# Patient Record
Sex: MALE | Race: ASIAN | NOT HISPANIC OR LATINO | Employment: OTHER | URBAN - METROPOLITAN AREA
[De-identification: names, ages, dates, MRNs, and addresses within clinical notes are randomized per-mention and may not be internally consistent; named-entity substitution may affect disease eponyms.]

---

## 2017-01-26 ENCOUNTER — HOSPITAL ENCOUNTER (EMERGENCY)
Facility: HOSPITAL | Age: 77
Discharge: HOME | End: 2017-01-26
Attending: EMERGENCY MEDICINE
Payer: MEDICARE

## 2017-01-26 VITALS
RESPIRATION RATE: 18 BRPM | WEIGHT: 155 LBS | TEMPERATURE: 98 F | HEIGHT: 67 IN | SYSTOLIC BLOOD PRESSURE: 132 MMHG | HEART RATE: 76 BPM | BODY MASS INDEX: 24.33 KG/M2 | OXYGEN SATURATION: 99 % | DIASTOLIC BLOOD PRESSURE: 63 MMHG

## 2017-01-26 DIAGNOSIS — N39.0 URINARY TRACT INFECTION WITH HEMATURIA, SITE UNSPECIFIED: Primary | ICD-10-CM

## 2017-01-26 DIAGNOSIS — R31.9 HEMATURIA: ICD-10-CM

## 2017-01-26 DIAGNOSIS — R31.9 URINARY TRACT INFECTION WITH HEMATURIA, SITE UNSPECIFIED: Primary | ICD-10-CM

## 2017-01-26 LAB
ANION GAP SERPL CALC-SCNC: 7 MMOL/L
ANISOCYTOSIS BLD QL SMEAR: SLIGHT
BACTERIA #/AREA URNS AUTO: ABNORMAL /HPF
BASOPHILS # BLD AUTO: 0 K/UL
BASOPHILS NFR BLD: 0 %
BILIRUB UR QL STRIP: NEGATIVE
BUN SERPL-MCNC: 58 MG/DL
CALCIUM SERPL-MCNC: 9.7 MG/DL
CHLORIDE SERPL-SCNC: 104 MMOL/L
CLARITY UR REFRACT.AUTO: ABNORMAL
CO2 SERPL-SCNC: 28 MMOL/L
COLOR UR AUTO: ABNORMAL
CREAT SERPL-MCNC: 1.5 MG/DL
DIFFERENTIAL METHOD: ABNORMAL
EOSINOPHIL # BLD AUTO: 0 K/UL
EOSINOPHIL NFR BLD: 0 %
ERYTHROCYTE [DISTWIDTH] IN BLOOD BY AUTOMATED COUNT: 15.9 %
EST. GFR  (AFRICAN AMERICAN): 50.8 ML/MIN/1.73 M^2
EST. GFR  (NON AFRICAN AMERICAN): 44 ML/MIN/1.73 M^2
GLUCOSE SERPL-MCNC: 143 MG/DL
GLUCOSE UR QL STRIP: NEGATIVE
HCT VFR BLD AUTO: 40.4 %
HGB BLD-MCNC: 13 G/DL
HGB UR QL STRIP: ABNORMAL
HYALINE CASTS UR QL AUTO: 0 /LPF
INR PPP: 1
KETONES UR QL STRIP: NEGATIVE
LEUKOCYTE ESTERASE UR QL STRIP: ABNORMAL
LYMPHOCYTES # BLD AUTO: 0.8 K/UL
LYMPHOCYTES NFR BLD: 8.2 %
MCH RBC QN AUTO: 30.7 PG
MCHC RBC AUTO-ENTMCNC: 32.2 %
MCV RBC AUTO: 95 FL
MICROSCOPIC COMMENT: ABNORMAL
MONOCYTES # BLD AUTO: 1.2 K/UL
MONOCYTES NFR BLD: 12.6 %
NEUTROPHILS # BLD AUTO: 7.4 K/UL
NEUTROPHILS NFR BLD: 79.2 %
NITRITE UR QL STRIP: NEGATIVE
PH UR STRIP: 5 [PH] (ref 5–8)
PLATELET # BLD AUTO: 125 K/UL
PLATELET BLD QL SMEAR: ABNORMAL
PMV BLD AUTO: ABNORMAL FL
POTASSIUM SERPL-SCNC: 5.2 MMOL/L
PROT UR QL STRIP: ABNORMAL
PROTHROMBIN TIME: 10.3 SEC
RBC # BLD AUTO: 4.24 M/UL
RBC #/AREA URNS AUTO: >100 /HPF (ref 0–4)
SODIUM SERPL-SCNC: 139 MMOL/L
SP GR UR STRIP: 1.02 (ref 1–1.03)
SQUAMOUS #/AREA URNS AUTO: 2 /HPF
URN SPEC COLLECT METH UR: ABNORMAL
UROBILINOGEN UR STRIP-ACNC: NEGATIVE EU/DL
WBC # BLD AUTO: 9.35 K/UL
WBC #/AREA URNS AUTO: >100 /HPF (ref 0–5)

## 2017-01-26 PROCEDURE — 80048 BASIC METABOLIC PNL TOTAL CA: CPT

## 2017-01-26 PROCEDURE — 81001 URINALYSIS AUTO W/SCOPE: CPT

## 2017-01-26 PROCEDURE — 99284 EMERGENCY DEPT VISIT MOD MDM: CPT | Mod: ,,, | Performed by: PHYSICIAN ASSISTANT

## 2017-01-26 PROCEDURE — 85610 PROTHROMBIN TIME: CPT

## 2017-01-26 PROCEDURE — 85025 COMPLETE CBC W/AUTO DIFF WBC: CPT

## 2017-01-26 PROCEDURE — 87086 URINE CULTURE/COLONY COUNT: CPT

## 2017-01-26 PROCEDURE — 25000003 PHARM REV CODE 250: Performed by: PHYSICIAN ASSISTANT

## 2017-01-26 PROCEDURE — 99284 EMERGENCY DEPT VISIT MOD MDM: CPT

## 2017-01-26 RX ORDER — CEPHALEXIN 500 MG/1
500 CAPSULE ORAL EVERY 12 HOURS
Qty: 14 CAPSULE | Refills: 0 | Status: SHIPPED | OUTPATIENT
Start: 2017-01-27 | End: 2017-02-03

## 2017-01-26 RX ORDER — CEPHALEXIN 500 MG/1
500 CAPSULE ORAL
Status: COMPLETED | OUTPATIENT
Start: 2017-01-26 | End: 2017-01-26

## 2017-01-26 RX ADMIN — CEPHALEXIN 500 MG: 500 CAPSULE ORAL at 03:01

## 2017-01-26 NOTE — ED NOTES
Patient identifiers verified and correct for Anirudh Esmail.    C/C: hematuria  APPEARANCE: awake and alert in NAD.  SKIN: warm, dry and intact. No breakdown or bruising.  MUSCULOSKELETAL: Patient moving all extremities spontaneously, no obvious swelling or deformities noted. Ambulates independently.  RESPIRATORY: no shortness of breath. All breath sounds CTA bilaterally.  CARDIAC: heart tones normal. Regular rate and rhythm; 2+ distal pulses; no peripheral edema  ABDOMEN: S/ND/NT, normoactive bowel sounds present in all four quadrants. Normal stool pattern.  : painful hematuria.  Neurologic: AAO x 4; follows commands equal strength in all extremities; denies numbness/tingling. PERRLA

## 2017-01-26 NOTE — ED AVS SNAPSHOT
[facility]  [address] [facility] [address]               Anirudh SUZI Esmail   2017 12:51 PM   ED    Description:  Male : 1940   Department:  [facility]           Your Care was Coordinated By:     Provider Role From To    Pranav Vela MD Attending Provider [phone] --    Alejandra Bhakta PA-C Physician Assistant [phone] --      Reason for Visit     Hematuria           Diagnoses this Visit        Comments    Urinary tract infection with hematuria, site unspecified    -  Primary     Hematuria           ED Disposition     None           To Do List           Follow-up Information     Follow up with Benedict Ennis MD In 1 week.    Specialty:  Internal Medicine    Contact information:    [address]  [place] [address] [phone]          Follow up with [facility] - Urology 4th Floor In 1 day.    Specialty:  Urology    Contact information:    [address] [facility] [id number] [phone]    Additional information:    [facility] - 4th Floor        Follow up with [facility].    Specialty:  Emergency Medicine    Why:  If symptoms worsen    Contact information:    [address] [facility] [id number] [phone]       These Medications        Disp Refills Start End    cephALEXin (KEFLEX) 500 MG capsule 14 capsule 0 2017 2/3/2017    Take 1 capsule (500 mg total) by mouth every 12 (twelve) hours. - Oral      [facility]     [facility] -  Assistance  Registered nurses in the [facility] provide clinical advisement, health education, appointment booking, and other advisory services.  Call for this free service at [phone].             Medications           Message regarding Medications     Verify the changes and/or additions to your medication regime listed below are the same as discussed with your clinician  today.  If any of these changes or additions are incorrect, please notify your healthcare provider.        START taking these NEW medications        Refills    cephALEXin (KEFLEX) 500 MG capsule 0    Starting on: 1/27/2017    Sig: Take 1 capsule (500 mg total) by mouth every 12 (twelve) hours.    Class: Print    Route: Oral      These medications were administered today        Dose Freq    cephALEXin capsule 500 mg 500 mg ED 1 Time    Sig: Take 1 capsule (500 mg total) by mouth ED 1 Time.    Class: Normal    Route: Oral    Cosign for Ordering: Required by Pranav Vela MD           Verify that the below list of medications is an accurate representation of the medications you are currently taking.  If none reported, the list may be blank. If incorrect, please contact your healthcare provider. Carry this list with you in case of emergency.           Current Medications     acetaminophen (TYLENOL) 500 mg Cap Please administer via GT every 6 hourly as needed    ascorbic acid, vitamin C, (VITAMIN C) 250 MG tablet 1 tablet (250 mg total) by Per G Tube route once daily.    atorvastatin (LIPITOR) 20 MG tablet 1 tablet (20 mg total) by Per G Tube route once daily.    cephALEXin (KEFLEX) 500 MG capsule Starting on Jan 27, 2017. Take 1 capsule (500 mg total) by mouth every 12 (twelve) hours.    cephALEXin capsule 500 mg Take 1 capsule (500 mg total) by mouth ED 1 Time.    cholestyramine (QUESTRAN) 4 gram packet     clopidogrel (PLAVIX) 75 mg tablet 1 tablet (75 mg total) by Per G Tube route once daily.    ferrous sulfate 220 mg (44 mg iron)/5 mL solution     finasteride (PROSCAR) 5 mg tablet 1 tablet (5 mg total) by Per G Tube route once daily.    furosemide (LASIX) 20 MG tablet 1 tablet (20 mg total) by Per G Tube route once daily.    lisinopril (PRINIVIL,ZESTRIL) 2.5 MG tablet 1 tablet (2.5 mg total) by Per G Tube route once daily.    magnesium oxide (MAG-OX) 400 mg tablet 1 tablet (400 mg total) by Per G Tube  "route 2 (two) times daily.    nitroGLYCERIN (NITROSTAT) 0.4 MG SL tablet Place 1 tablet (0.4 mg total) under the tongue every 5 (five) minutes as needed for Chest pain.    pantoprazole (PROTONIX) 40 MG tablet     pramipexole (MIRAPEX) 0.125 MG tablet 1 tablet (0.125 mg total) by Per G Tube route 2 (two) times daily.    tamsulosin (FLOMAX) 0.4 mg Cp24 1 capsule (0.4 mg total) by Per G Tube route once daily.    trazodone (DESYREL) 50 MG tablet 1 tablet (50 mg total) by Per G Tube route every evening.           Clinical Reference Information           Your Vitals Were     BP Pulse Temp Resp Height Weight    120/58 70 97.6 °F (36.4 °C) (Oral) 16 5' 7" (1.702 m) 70.3 kg (155 lb)    SpO2 BMI             97% 24.28 kg/m2         Allergies as of 1/26/2017        Reactions    Pork/porcine Containing Products     Patient wishes to avoid pork products in his diet.      Immunizations Administered on Date of Encounter - 1/26/2017     None      ED Micro, Lab, POCT     Start Ordered       Status Ordering Provider    01/26/17 1457 01/26/17 1456  Protime-INR  Add-on      Completed     01/26/17 1441 01/26/17 1440  Urine culture **CANNOT BE ORDERED STAT**  Add-on      Completed     01/26/17 1318 01/26/17 1317  CBC auto differential  STAT      Final result     01/26/17 1318 01/26/17 1317  Basic metabolic panel  STAT      Final result     01/26/17 1300 01/26/17 1259  Urinalysis  STAT      Final result     01/26/17 1259 01/26/17 1259  Urinalysis Microscopic  Once      Final result       ED Imaging Orders     None        Discharge Instructions       Closely follow up with urology.  Take oral antibiotics (Keflex) 2 times a day for the next 7 days. Stay hydrated by drinking plenty of fluids. Follow-up with PCP as scheduled or earlier if needed.  Return to ED for any worsening symptoms.      Bladder Infection, Male (Adult)    You have a bladder infection.  Urine is normally free of bacteria. But bacteria can get into the urinary tract from " the skin around the rectum or it may travel in the blood from elsewhere in the body.  This is called a urinary tract infection (UTI). An infection can occur anywhere in the urinary tract. It could be in a kidney (pyelonrphritis)or in the bladder (cystitis) and urethra (urethritis). The urethra is the tube that drains the urine from the bladder through the tip of the penis.  The most common place for a UTI is in the bladder. This is called a bladder infection. Most bladder infections are easily treated. They are not serious unless the infection spreads up to the kidney.  The terms bladder infection, UTI, and cystitis are often used to describe the same thing, but they arent always the same. Cystitis is an inflammation of the bladder. The most common cause of cystitis is an infection.   Keep in mind:  · Infections in the urine are called UTIs.  · Cystitis is usually caused by a UTI.  · Not all UTIs and cases of cystitis are bladder infections.  · Bladder infections are the most common type of cystitis.  Symptoms of a bladder infection  The infection causes inflammation in the urethra and bladder. This inflammation causes many of the symptoms. The most common symptoms of a bladder infection are:  · Pain or burning when urinating  · Having to go more often than usual  · Feeling like you need to go right away  · Only a small amount comes out  · Blood in urine  · Discomfort in your belly (abdomen), usually in the lower abdomen, above the pubic bone  · Cloudy, strong, or bad smelling urine  · Unable to urinate (retention)  · Urinary incontinence  · Fever  · Loss of appetite  Older adults may also feel confused.  Causes of a bladder infection  Bladder infections are not contagious. You can't get one from someone else, from a toilet seat, or from sharing a bath.  The most common cause of bladder infections is bacteria from the bowels. The bacteria get onto the skin around the opening of the urethra. From there they can get  into the urine and travel up to the bladder. This causes inflammation and an infection. This usually happens because of:  · An enlarged prostate  · Poor cleaning of the genitals  · Procedures that put a tube in your bladder, like a Fontana catheter  · Bowel incontinence  · Older age  · Not emptying your bladder (The urine stays there, giving the bacteria a chance to grow.)  · Dehydration (This allows urine to stay in the bladder longer.)  · Constipation (This can cause the bowels to push on the bladder or urethra and keep the bladder from emptying.)  Treatment  Bladder infections are treated with antibiotics. They usually clear up quickly without complications. Treatment helps prevent a more serious kidney infection.  Medicines  Medicines can help in the treatment of a bladder infection:  · You have been prescribed antibiotics. Take this medicine until you have finished it, even if you feel better. Taking all of the medicine will make sure the infection has cleared.  You can use acetaminophen or ibuprofen for pain, fever, or discomfort, unless another medicine was prescribed. You can also alternate them, or use both together. They work differently and are a different class of medicines, so taking them together is not an overdose. If you have chronic liver or kidney disease, talk with your healthcare provider before using these medicines. Also talk with your provider if youve had a stomach ulcer or GI bleeding or are taking blood thinner medicines.  · You may have been given phenazopyridine to ease burning when you urinate. It will cause your urine to be bright orange. It can stain clothing.  Home care  General care  · Drink plenty of fluids, unless your healthcare provider told you not to. Fluids will prevent dehydration and flush out your bladder.  · Use good personal hygiene. Wipe from front to back after using the toilet, and clean your penis regularly. If you arent circumcised, retract the foreskin when  cleaning.  · Urinate more frequently, and dont try to hold it in for long periods of time, if possible.  · Wear loose-fitting clothes and cotton underwear. Avoid tight-fitting pants. This helps keep you clean and dry.  · Change your diet to prevent constipation. This means eating more fresh foods and more fiber, and less junk and fatty foods.  · Avoid sex until your symptoms are gone.  · Avoid caffeine, alcohol, and spicy foods. These can irritate the bladder.  Follow-up care  Follow up with your healthcare provider, or as advised if all symptoms have not cleared up within 5 days. It is important to keep your follow-up appointment. You can talk with your provider to see if you need more tests of the urinary tract. This is especially important if you have infections that keep coming back.  If a culture was done, you will be told if your treatment needs to be changed. If directed, you can call to find out the results.  If X-rays were taken, you will be told if the results will affect your treatment.  Call 911  Call 911 if any of these occur:  · Trouble breathing  · Difficulty waking up  · Feeling confused  · Fainting or loss of consciousness  · Rapid heart rate  When to seek medical advice  Call your healthcare provider right away if any of these occur:  · Fever of 100.4ºF (38ºC) or higher, or as directed by your healthcare provider  · Your symptoms dont get better after 2 days of treatment  · Back or abdominal pain that gets worse  · Repeated vomiting, or you arent able to keep medicine down  · Weakness or dizziness  © 7210-7397 [employer]. [address] [place] [address], [place] [address]. All rights reserved. This information is not intended as a substitute for professional medical care. Always follow your healthcare professional's instructions.          What is Hematuria?  Blood in your urine is a condition known as hematuria. Most of the time, the cause of hematuria is not serious. However, blood in the  urine should never be ignored. Your doctor can evaluate you to identify the cause of the bleeding and treat it, if necessary.    Types of hematuria  · Gross hematuria means that the blood can be seen by the naked eye. The urine may look pinkish, brownish, or bright red.  · Microscopic hematuria means that the urine is clear, but blood cells can be seen when urine is looked at under a microscope or tested in a lab.  Both gross and microscopic hematuria can have the same causes, and neither one is necessarily more serious than the other. Along with either type, you may notice other symptoms, such as pain, pressure, or burning when you urinate, abdominal pain, or back pain. Or, you may not notice any other symptoms. No matter how much blood is found, the cause of the bleeding needs to be identified.  Finding the cause of hematuria  To evaluate your condition, the doctor will first confirm that blood is indeed present. Then other tests are done to pinpoint where the blood is coming from and why. Your doctor will decide which tests will best determine the cause of your hematuria. Some common tests are listed below.  · History and physical exam  · Lab tests may include urinalysis, a urine culture, a urine cytology, and other blood tests  · Intravenous pyelogram (IVP)  · Cystoscopy  · Other tests may include:  ¨ Computed tomography (CT) or CT urography to study the kidneys and urinary tract  ¨ Magnetic resonance imaging (MRI) or MR urography  ¨ Ultrasound of the kidney to study the kidneys and the urinary tract  ¨ Cystourethrogram  © [id number] [employer]. [address] [place] [address], [place] [address]. All rights reserved. This information is not intended as a substitute for professional medical care. Always follow your healthcare professional's instructions.       [facility] complies with applicable Federal civil rights laws and does not discriminate on the basis of race, color, national  origin, age, disability, or sex.        Language Assistance Services     ATTENTION: Language assistance services are available, free of charge. Please call [phone].      ATENCIÓN: Si habdmitriy lafleur, tiene a mulligan disposición servicios gratuitos de asistencia lingüística. Llame al [phone].     CHÚ Ý: N?u b?n nói Ti?ng Vi?t, có các d?ch v? h? tr? ngôn ng? mi?n phí dành cho b?n. G?i s? [phone].

## 2017-01-26 NOTE — DISCHARGE INSTRUCTIONS
Closely follow up with urology.  Take oral antibiotics (Keflex) 2 times a day for the next 7 days. Stay hydrated by drinking plenty of fluids. Follow-up with PCP as scheduled or earlier if needed.  Return to ED for any worsening symptoms.      Bladder Infection, Male (Adult)    You have a bladder infection.  Urine is normally free of bacteria. But bacteria can get into the urinary tract from the skin around the rectum or it may travel in the blood from elsewhere in the body.  This is called a urinary tract infection (UTI). An infection can occur anywhere in the urinary tract. It could be in a kidney (pyelonrphritis)or in the bladder (cystitis) and urethra (urethritis). The urethra is the tube that drains the urine from the bladder through the tip of the penis.  The most common place for a UTI is in the bladder. This is called a bladder infection. Most bladder infections are easily treated. They are not serious unless the infection spreads up to the kidney.  The terms bladder infection, UTI, and cystitis are often used to describe the same thing, but they arent always the same. Cystitis is an inflammation of the bladder. The most common cause of cystitis is an infection.   Keep in mind:  · Infections in the urine are called UTIs.  · Cystitis is usually caused by a UTI.  · Not all UTIs and cases of cystitis are bladder infections.  · Bladder infections are the most common type of cystitis.  Symptoms of a bladder infection  The infection causes inflammation in the urethra and bladder. This inflammation causes many of the symptoms. The most common symptoms of a bladder infection are:  · Pain or burning when urinating  · Having to go more often than usual  · Feeling like you need to go right away  · Only a small amount comes out  · Blood in urine  · Discomfort in your belly (abdomen), usually in the lower abdomen, above the pubic bone  · Cloudy, strong, or bad smelling urine  · Unable to urinate (retention)  · Urinary  incontinence  · Fever  · Loss of appetite  Older adults may also feel confused.  Causes of a bladder infection  Bladder infections are not contagious. You can't get one from someone else, from a toilet seat, or from sharing a bath.  The most common cause of bladder infections is bacteria from the bowels. The bacteria get onto the skin around the opening of the urethra. From there they can get into the urine and travel up to the bladder. This causes inflammation and an infection. This usually happens because of:  · An enlarged prostate  · Poor cleaning of the genitals  · Procedures that put a tube in your bladder, like a Fontana catheter  · Bowel incontinence  · Older age  · Not emptying your bladder (The urine stays there, giving the bacteria a chance to grow.)  · Dehydration (This allows urine to stay in the bladder longer.)  · Constipation (This can cause the bowels to push on the bladder or urethra and keep the bladder from emptying.)  Treatment  Bladder infections are treated with antibiotics. They usually clear up quickly without complications. Treatment helps prevent a more serious kidney infection.  Medicines  Medicines can help in the treatment of a bladder infection:  · You have been prescribed antibiotics. Take this medicine until you have finished it, even if you feel better. Taking all of the medicine will make sure the infection has cleared.  You can use acetaminophen or ibuprofen for pain, fever, or discomfort, unless another medicine was prescribed. You can also alternate them, or use both together. They work differently and are a different class of medicines, so taking them together is not an overdose. If you have chronic liver or kidney disease, talk with your healthcare provider before using these medicines. Also talk with your provider if youve had a stomach ulcer or GI bleeding or are taking blood thinner medicines.  · You may have been given phenazopyridine to ease burning when you urinate. It  will cause your urine to be bright orange. It can stain clothing.  Home care  General care  · Drink plenty of fluids, unless your healthcare provider told you not to. Fluids will prevent dehydration and flush out your bladder.  · Use good personal hygiene. Wipe from front to back after using the toilet, and clean your penis regularly. If you arent circumcised, retract the foreskin when cleaning.  · Urinate more frequently, and dont try to hold it in for long periods of time, if possible.  · Wear loose-fitting clothes and cotton underwear. Avoid tight-fitting pants. This helps keep you clean and dry.  · Change your diet to prevent constipation. This means eating more fresh foods and more fiber, and less junk and fatty foods.  · Avoid sex until your symptoms are gone.  · Avoid caffeine, alcohol, and spicy foods. These can irritate the bladder.  Follow-up care  Follow up with your healthcare provider, or as advised if all symptoms have not cleared up within 5 days. It is important to keep your follow-up appointment. You can talk with your provider to see if you need more tests of the urinary tract. This is especially important if you have infections that keep coming back.  If a culture was done, you will be told if your treatment needs to be changed. If directed, you can call to find out the results.  If X-rays were taken, you will be told if the results will affect your treatment.  Call 911  Call 911 if any of these occur:  · Trouble breathing  · Difficulty waking up  · Feeling confused  · Fainting or loss of consciousness  · Rapid heart rate  When to seek medical advice  Call your healthcare provider right away if any of these occur:  · Fever of 100.4ºF (38ºC) or higher, or as directed by your healthcare provider  · Your symptoms dont get better after 2 days of treatment  · Back or abdominal pain that gets worse  · Repeated vomiting, or you arent able to keep medicine down  · Weakness or dizziness  © 5247-4907  [employer]. [address] [place] [address], [place] [address]. All rights reserved. This information is not intended as a substitute for professional medical care. Always follow your healthcare professional's instructions.          What is Hematuria?  Blood in your urine is a condition known as hematuria. Most of the time, the cause of hematuria is not serious. However, blood in the urine should never be ignored. Your doctor can evaluate you to identify the cause of the bleeding and treat it, if necessary.    Types of hematuria  · Gross hematuria means that the blood can be seen by the naked eye. The urine may look pinkish, brownish, or bright red.  · Microscopic hematuria means that the urine is clear, but blood cells can be seen when urine is looked at under a microscope or tested in a lab.  Both gross and microscopic hematuria can have the same causes, and neither one is necessarily more serious than the other. Along with either type, you may notice other symptoms, such as pain, pressure, or burning when you urinate, abdominal pain, or back pain. Or, you may not notice any other symptoms. No matter how much blood is found, the cause of the bleeding needs to be identified.  Finding the cause of hematuria  To evaluate your condition, the doctor will first confirm that blood is indeed present. Then other tests are done to pinpoint where the blood is coming from and why. Your doctor will decide which tests will best determine the cause of your hematuria. Some common tests are listed below.  · History and physical exam  · Lab tests may include urinalysis, a urine culture, a urine cytology, and other blood tests  · Intravenous pyelogram (IVP)  · Cystoscopy  · Other tests may include:  ¨ Computed tomography (CT) or CT urography to study the kidneys and urinary tract  ¨ Magnetic resonance imaging (MRI) or MR urography  ¨ Ultrasound of the kidney to study the kidneys and the urinary tract  ¨ Cystourethrogram  ©  4297-4475 [employer]. [address] [place] [address], [place] [address]. All rights reserved. This information is not intended as a substitute for professional medical care. Always follow your healthcare professional's instructions.

## 2017-01-26 NOTE — ED PROVIDER NOTES
"Encounter Date: 1/26/2017       History     Chief Complaint   Patient presents with    Hematuria     x 1 day.      Review of patient's allergies indicates:   Allergen Reactions    Pork/porcine containing products      Patient wishes to avoid pork products in his diet.     HPI Comments: Patient is a 76-year-old male with a past medical history of CAD status post CABG on Plavix, CKD stage III, BPH, HTN, HLD, DM 2 who presents to the ED with hematuria, dysuria, and urinary frequency/urgency onset last night.  Patient states that he noticed gross hematuria last night.  He states that it is painful when he urinates.  He denies pain at rest.  He states that he urinated approximately 20 times last night.  He states that he feels like he needs to go again after urinating. Patient is unsure what he is taking for his BPH, but his chart reports finasteride and Flomax.  He denies any trauma or injury, fever, chills, chest pain, cough, SOB, abdominal pain, nausea, vomiting, blood in stool, paresthesias, or weakness.      The history is provided by the patient.     Past Medical History   Diagnosis Date    Amblyopia of left eye      Since infancy    Atypical chest pain 4/23/2015     "d/t to a possible esophageal obstruction, stricture or esophageal dysmotility with resultant esophageal dilatation" per MD's note (Amy Ulloa MD, Cardiology note 4/23/2015)    Bilateral carpal tunnel syndrome     BPH (benign prostatic hypertrophy)     Cataract      Previously removed from right eye; 7/2015: Pt concerned about left eye cataract.    CHF (congestive heart failure)      Cardiomyopathy    Chronic gastritis 2/2005     Per EGD    Chronic kidney disease (CKD), stage III (moderate)     Coronary artery disease     CVA (cerebral vascular accident)     Diabetes mellitus type II     Dizziness     DJD (degenerative joint disease)     Dysarthria 2004 to present     Initial onset in ~ 2004 (pharyngeal/oropharyngeal dysphagia " "reported seen in regular barium swallow study); signficant worsening of swallowing after anoxic encephalopathy in 2010.    Esophageal abnormality 4/24/2015     Noted on chest xray and CT-chest: "Dilated esophagus" [to the level of the mid thoracic esophagus]; "may be achalasia or pseudo-achalasia related to tumor" per Kenn Bass MD, [facility]. Pt declined EGD at [facility] in 4/2015 and 5/2015.    Esophageal dysmotility 7/12/2004     Observed during regular barium swallow study.    Exotropia      Left eye, since infancy    G tube feedings     Gastropathy 2/2005     Nodular gastropathy, erythematous gastropathy per EGD    GERD (gastroesophageal reflux disease)     Hip fracture 2013    Laryngeal disorder      Decreased laryngeal sensation (see ENT note in [facility] legacy documents: Dr. Warren, 05/26/2010)    LBBB (left bundle branch block)     Liver disorder      Hx of granulomatous liver with h/o mild LFT elevation    MI (myocardial infarction) 3/2014, 1/2015     NSTEMI    Oropharyngeal dysphagia 2004 to present     Onset 2003 or 2004 (s/p CABG) or 2005 (s/p CABG) per pt report; worsened in 2010 after anoxic brain injury from stroke and respiratory arrest; significant aspiration on swallow study 3/19/2014. Pharyngeal abnormalities noted during esophagram in 2004. Advised to be strict NPO at all times. He has had many visits of dysphagia therapy including visits many with neuromuscular electrical stimulation.    S/P CABG x 3     Ulnar neuropathy at wrist      Bilateral     No past medical history pertinent negatives.  Past Surgical History   Procedure Laterality Date    Pr cabg, artery-vein, three  2003     Coronary Artery Bypass, 3    Gastrostomy  4/3/10     PEG placement after stroke    Peg tube removal  11/1/2010    Gastrostomy tube placement  3/20/2014     Placed at [facility] by Dr. Ronald Washington    Hip surgery       Closed Reduction Percutaneous pinning, left impacted femoral " neck fracture    Esophagogastroduodenoscopy       [facility]s [facility])    Cataract extraction       Family History   Problem Relation Age of Onset    Parkinsonism Neg Hx     Seizures Neg Hx     Stroke Neg Hx      Social History   Substance Use Topics    Smoking status: Never Smoker    Smokeless tobacco: Never Used    Alcohol use No     Review of Systems   Constitutional: Negative for chills and fever.   HENT: Negative for ear pain and sore throat.    Eyes: Negative for pain.   Respiratory: Negative for cough and shortness of breath.    Cardiovascular: Negative for chest pain.   Gastrointestinal: Negative for abdominal pain, diarrhea and nausea.   Genitourinary: Positive for decreased urine volume, difficulty urinating, dysuria, frequency, hematuria and urgency. Negative for flank pain.   Musculoskeletal: Negative for back pain and neck pain.   Skin: Negative for pallor.   Neurological: Negative for dizziness, light-headedness and headaches.       Physical Exam   Initial Vitals   BP Pulse Resp Temp SpO2   01/26/17 1043 01/26/17 1043 01/26/17 1043 01/26/17 1043 01/26/17 1043   120/58 70 16 97.6 °F (36.4 °C) 97 %     Physical Exam    Nursing note and vitals reviewed.  Constitutional: He appears well-developed and well-nourished. He is not diaphoretic. No distress.   HENT:   Head: Normocephalic and atraumatic.   Nose: Nose normal.   Eyes: EOM are normal.   Neck: Normal range of motion.   Cardiovascular: Normal rate, regular rhythm, normal heart sounds and intact distal pulses. Exam reveals no friction rub.    No murmur heard.  Pulmonary/Chest: Breath sounds normal. No respiratory distress. He has no wheezes. He has no rales.   Abdominal: Soft. Bowel sounds are normal. He exhibits no distension. There is no tenderness. There is no rebound.   Musculoskeletal: Normal range of motion.   Neurological: He is alert and oriented to person, place, and time. He has normal strength.   Skin: Skin is warm. No  erythema.   Psychiatric: He has a normal mood and affect. His behavior is normal. Thought content normal.         ED Course   Procedures  Labs Reviewed   URINALYSIS - Abnormal; Notable for the following:        Result Value    Color, UA Red (*)     Appearance, UA Cloudy (*)     Protein, UA 2+ (*)     Occult Blood UA 3+ (*)     Leukocytes, UA 2+ (*)     All other components within normal limits   CBC W/ AUTO DIFFERENTIAL - Abnormal; Notable for the following:     RBC 4.24 (*)     Hemoglobin 13.0 (*)     RDW 15.9 (*)     Platelets 125 (*)     Lymph # 0.8 (*)     Mono # 1.2 (*)     Gran% 79.2 (*)     Lymph% 8.2 (*)     Platelet Estimate Decreased (*)     All other components within normal limits   BASIC METABOLIC PANEL - Abnormal; Notable for the following:     Potassium 5.2 (*)     Glucose 143 (*)     BUN, Bld 58 (*)     Creatinine 1.5 (*)     Anion Gap 7 (*)     eGFR if  50.8 (*)     eGFR if non  44.0 (*)     All other components within normal limits   URINALYSIS MICROSCOPIC - Abnormal; Notable for the following:     RBC, UA >100 (*)     WBC, UA >100 (*)     All other components within normal limits   CULTURE, URINE   CULTURE, URINE    Narrative:     ADD ON PER DR ANDREA TREVIÑO ORDER [id number] URINE CULTURE @1510 1/26/17  1 CUP OF URINE   PROTIME-INR   PROTIME-INR    Narrative:     ADD ON PER DR ANDREA TREVIÑO ORDER [id number] PT/INR @1514 1/26/17             Medical Decision Making:   History:   Old Medical Records: I decided to obtain old medical records.  Clinical Tests:   Lab Tests: Ordered and Reviewed       APC / Resident Notes:   Patient is a 76-year-old male with a past medical history of CAD status post CABG on plavix, CKD stage III, BPH, HTN, HLD, DM 2 who presents to the ED with hematuria, dysuria, urinary frequency/urgency onset last night.  Afebrile.  Unremarkable physical exam.  DDX includes but is not limited to UTI, urinary obstruction, urinary retention, BPH, prostatitis.   Will bladder scan, order labs, and reassess.    Bladder scan shows no urine in bladder.  UA with negative nitrite.  2+ leukocytes.  3+ blood.  >100 rbc's and >100 WBCs on microscopy.  Urine culture pending.  CBC with no leukocytosis or anemia. BMP with no significant electrolyte abnormalities.  Creatinine of 1.5 (creatinine of 1.3 two months ago). PTT/INR WNL.    Patient updated with results.  I will treat patient for UTI.  I feel that he does not need any further labs or imaging at this time. I will prescribe him oral Keflex to take. CrCl of 40.4. Safe for patient to take bid. He is to continue to orally hydrate himself.  He is to follow up closely with urology.  Follow-up with PCP as scheduled or earlier if needed. Continue home medication as prescribed. Strict ED return precaution given.  All questions answered.  He is comfortable with plan and stable for discharge.  I have reviewed patient's chart and labs and discussed this case with my supervising M.D.         Attending Attestation:     Physician Attestation Statement for NP/PA:   I discussed this assessment and plan of this patient with the NP/PA, but I did not personally examine the patient. The face to face encounter was performed by the NP/PA.                  ED Course     Clinical Impression:   The primary encounter diagnosis was Urinary tract infection with hematuria, site unspecified. A diagnosis of Hematuria was also pertinent to this visit.    Disposition:   Disposition: Discharged  Condition: Stable       Alejandra Bhakta PA-C  01/26/17 9466       Pranav Vela MD  01/29/17 8538

## 2017-01-27 LAB — BACTERIA UR CULT: NORMAL

## 2017-04-04 ENCOUNTER — HOSPITAL ENCOUNTER (EMERGENCY)
Facility: HOSPITAL | Age: 77
Discharge: HOME | End: 2017-04-04
Attending: EMERGENCY MEDICINE
Payer: MEDICARE

## 2017-04-04 VITALS
DIASTOLIC BLOOD PRESSURE: 68 MMHG | SYSTOLIC BLOOD PRESSURE: 124 MMHG | OXYGEN SATURATION: 95 % | HEART RATE: 64 BPM | HEIGHT: 67 IN | RESPIRATION RATE: 18 BRPM | WEIGHT: 153 LBS | BODY MASS INDEX: 24.01 KG/M2 | TEMPERATURE: 98 F

## 2017-04-04 DIAGNOSIS — T85.848A PAIN AROUND PEG TUBE SITE, INITIAL ENCOUNTER: Primary | ICD-10-CM

## 2017-04-04 DIAGNOSIS — R10.9 ABDOMINAL PAIN: ICD-10-CM

## 2017-04-04 LAB
BASOPHILS # BLD AUTO: 0.01 K/UL
BASOPHILS NFR BLD: 0.1 %
BUN SERPL-MCNC: 47 MG/DL (ref 6–30)
CHLORIDE SERPL-SCNC: 106 MMOL/L (ref 95–110)
CREAT SERPL-MCNC: 1.4 MG/DL (ref 0.5–1.4)
DIFFERENTIAL METHOD: ABNORMAL
EOSINOPHIL # BLD AUTO: 0 K/UL
EOSINOPHIL NFR BLD: 0 %
ERYTHROCYTE [DISTWIDTH] IN BLOOD BY AUTOMATED COUNT: 15.3 %
GLUCOSE SERPL-MCNC: 165 MG/DL (ref 70–110)
HCT VFR BLD AUTO: 42.2 %
HCT VFR BLD CALC: 34 %PCV (ref 36–54)
HGB BLD-MCNC: 13.3 G/DL
LIPASE SERPL-CCNC: 37 U/L
LYMPHOCYTES # BLD AUTO: 0.9 K/UL
LYMPHOCYTES NFR BLD: 12 %
MCH RBC QN AUTO: 30.7 PG
MCHC RBC AUTO-ENTMCNC: 31.5 %
MCV RBC AUTO: 98 FL
MONOCYTES # BLD AUTO: 0.8 K/UL
MONOCYTES NFR BLD: 10.2 %
NEUTROPHILS # BLD AUTO: 5.7 K/UL
NEUTROPHILS NFR BLD: 77.7 %
PLATELET # BLD AUTO: 161 K/UL
PLATELET BLD QL SMEAR: ABNORMAL
PMV BLD AUTO: 14.1 FL
POC IONIZED CALCIUM: 1.17 MMOL/L (ref 1.06–1.42)
POC TCO2 (MEASURED): 24 MMOL/L (ref 23–29)
POTASSIUM BLD-SCNC: 5.5 MMOL/L (ref 3.5–5.1)
RBC # BLD AUTO: 4.33 M/UL
SAMPLE: ABNORMAL
SODIUM BLD-SCNC: 138 MMOL/L (ref 136–145)
WBC # BLD AUTO: 7.42 K/UL

## 2017-04-04 PROCEDURE — 96374 THER/PROPH/DIAG INJ IV PUSH: CPT

## 2017-04-04 PROCEDURE — 83690 ASSAY OF LIPASE: CPT

## 2017-04-04 PROCEDURE — 99284 EMERGENCY DEPT VISIT MOD MDM: CPT | Mod: 25

## 2017-04-04 PROCEDURE — 63600175 PHARM REV CODE 636 W HCPCS: Performed by: EMERGENCY MEDICINE

## 2017-04-04 PROCEDURE — 85025 COMPLETE CBC W/AUTO DIFF WBC: CPT

## 2017-04-04 PROCEDURE — 99284 EMERGENCY DEPT VISIT MOD MDM: CPT | Mod: ,,, | Performed by: EMERGENCY MEDICINE

## 2017-04-04 PROCEDURE — 25500020 PHARM REV CODE 255: Performed by: EMERGENCY MEDICINE

## 2017-04-04 RX ORDER — HYDROMORPHONE HYDROCHLORIDE 1 MG/ML
0.5 INJECTION, SOLUTION INTRAMUSCULAR; INTRAVENOUS; SUBCUTANEOUS
Status: COMPLETED | OUTPATIENT
Start: 2017-04-04 | End: 2017-04-04

## 2017-04-04 RX ORDER — ACETAMINOPHEN 500 MG/1
CAPSULE, LIQUID FILLED ORAL
Refills: 0 | COMMUNITY
Start: 2017-04-04

## 2017-04-04 RX ADMIN — HYDROMORPHONE HYDROCHLORIDE 0.5 MG: 1 INJECTION, SOLUTION INTRAMUSCULAR; INTRAVENOUS; SUBCUTANEOUS at 01:04

## 2017-04-04 RX ADMIN — IOHEXOL 75 ML: 350 INJECTION, SOLUTION INTRAVENOUS at 03:04

## 2017-04-04 NOTE — DISCHARGE INSTRUCTIONS
Discharge Instructions: Caring for Your Gastrostomy Tube (G-Tube)  You have been discharged with a gastrostomy tube, or G-tube. The G-tube was inserted through your belly (abdominal) wall and into your stomach. The tube will provide you with food, fluids, and medicine. Your G-tube may move in and out slightly. If the tube comes out all the way in the first few weeks after placement, dont put it back in. Call your healthcare provider right away. Dont wait until the next day. This is important because the G-tube tract through the skin may close very quickly, often within 24 hours. After the first few weeks, if the tube comes out, ask your provider how to get a spare tube. Ask your provider how to replace it at home.   General guidelines for use  · Wash your hands thoroughly with soap and warm water before starting your feeding.  · During the feeding and for 1 hour after, sit in a chair or sit up in bed.  · Before feeding begins, check to see that your stomach is empty. You will need a syringe for the following steps:   ¨ Insert the tip of an empty syringe into the end of the G-tube.  ¨ Pull back on the syringe to withdraw contents of the stomach.  ¨ Dont begin the feeding if more than 100 mL remains from the previous feeding.  · Clean the area around the tube with mild soap and water.  · Pat the area dry during bathing and as needed.  · Clean the area more often if it gets wet or is leaking some discharge (weeping).  · Keep the disk (flange) a few millimeters off the skin. This should leave just enough room for a gauze sponge. Pulling the flange too tightly can damage the skin. But leaving the flange too loose leads to leaking around the G-tube. Your healthcare team will go over these guidelines before you leave the hospital.     The name of my feeding supplement/formula is:  ___________________________________________     Amount per feeding:   ___________________________________________     Times per  day:  ___________________________________________     Amount of water used to flush tube:  ___________________________________________   Gravity feeding method  · Fill the feeding bag with the prescribed amount of formula. Run the fluid to the end of the tube to clear out any air. Clamp the tube.  · Connect the end of the feeding bag tubing to the G-tube.  · Hang the bag at least 18 inches above the level of your G-tube.  · Open the clamp and allow the formula to flow into the G-tube.  · Follow with the prescribed amount of water.  · After each feeding, rinse the bag and tubing. Every 24 hours, wash the bag and tubing with soapy water and rinse thoroughly.  Pump feeding method  · Fill the feeding bag with the prescribed amount of formula. Run the fluid to the end of the tube to clear out any air. Clamp the tube.  · Connect the end of the feeding bag tubing to the G-tube. Set the pump rate of flow to the prescribed rate per hour.  · Open the clamp on the tubing; press the start button on your pump.  · When feeding is complete, disconnect the feeding set.  · Connect the tip of an empty syringe to the feeding tube and slowly push in the prescribed amount of water.  · After each feeding, rinse the bag and tubing. Every 24 hours, wash the bag and tubing with soapy water and rinse thoroughly.  Syringe feeding method  · Remove the plunger from a syringe and connect the syringe to the G-tube.  · Hold the syringe upright and pour the formula into the syringe.  · Refill the syringe as the formula reaches the bottom of the syringe.  · Repeat the process until the prescribed amount of formula is given.  · Follow the feeding with the prescribed amount of water.  · After each feeding, rinse the bag and tubing. Every 24 hours, wash the bag and tubing with soapy water and rinse thoroughly.  When to call your provider  Call your healthcare provider right away if you have any of the following:  · The tube comes out   · The tube is  blocked  · Vomiting  · Fever above 100.4°F (38.0°C)  · Diarrhea that lasts more than 2 days  · Signs of infection (redness, swelling, or warmth at the tube site)  · Drainage from the tube site   Date Last Reviewed: 8/1/2016 © 2000-2016 [employer]. [address] [place] [address]. All rights reserved. This information is not intended as a substitute for professional medical care. Always follow your healthcare professional's instructions.      Feeding Tube Replacement  Your feeding tube has been replaced. Unless you are told otherwise, you may resume your usual feeding schedule. Feeding tubes are usually replaced every 6 to 12 months.  Home care  The following will help you care for yourself at home:  · Continue feedings as usual.  ¨ Wash your hands with soap and water before preparing the formula or touching the feeding tube.  ¨ Because the tube is narrow, use only commercial feeding formulas so the tube won't get clogged. These formulas are designed to provide all the protein, carbohydrates, vitamins, and minerals needed. Follow your healthcare providers advice (or the registered dietitian or nurse who is working with you) regarding the number of feedings to give each day.  ¨ Flush the tube with clear water before and after feedings or after medicine has been given through the tube. This is very important. Otherwise, the tube can get blocked.  ¨ When feeding, the patient should be upright or reclining at not less than 30 degrees to reduce the risk of the feeding solution draining improperly and causing aspiration into the lungs. Keep the patient in this position for at least 30 minutes after the feeding.  ¨ Infuse food slowly. It may take more than 1 hour for 1 feeding session.  · If the tube becomes blocked, flush with a syringe full of water.  · If you feel bloated after feeding, remove the cap from the end of the tube so that extra air in the stomach can flow out. Cough to help remove  the extra air.  · Oral and dental care is needed, even if you are not taking any food or liquids by mouth. Brush your teeth and gums daily. Keep the lips moist with a lip balm or petroleum jelly.  Follow-up care  Follow up with your healthcare provider, or as advised.  Call 911  Call 911 if any of the following occur:  · Chest pain  · Trouble breathing  When to seek medical care  Call your healthcare provider right away if any of these occur:  · Feeding tube becomes blocked and you are not able to clear it  · Feeding tube falls out  · Fever of 100.4ºF (38ºC) or higher, or as directed by your healthcare provider  · Leakage of stomach contents around the tube onto the abdomen  · Pain or swelling in your abdomen that gets worse  · Redness, pus, or bleeding at the insertion site  Date Last Reviewed: 7/1/2016  © 9823-9459 [employer] [address] [place] [address], [place] [address]. All rights reserved. This information is not intended as a substitute for professional medical care. Always follow your healthcare professional's instructions.

## 2017-04-04 NOTE — ED PROVIDER NOTES
"Encounter Date: 4/4/2017       History     Chief Complaint   Patient presents with    Abdominal Pain     states area around feeding tube painful     Review of patient's allergies indicates:   Allergen Reactions    Pork/porcine containing products      Patient wishes to avoid pork products in his diet.     HPI Comments: 76-year-old  male presents to the emergency department due to pain at his PEG tube site.  He is having pain for the last 2 weeks other than getting worse the last 2 days.  He says that his PEG tube has been functioning well.  It is not common out of that site.  He notes some mild bleeding around the area.  Denies having any babatunde discharge from the site of PEG tube.  He went to his primary care doctor earlier today for evaluation.  However stated that he was not given anything for his pain so he decided to come to this emergency department.  Denies any nausea, vomiting, changes in bowel or bladder habits.    The history is provided by the patient.     Past Medical History:   Diagnosis Date    Amblyopia of left eye     Since infancy    Atypical chest pain 4/23/2015    "d/t to a possible esophageal obstruction, stricture or esophageal dysmotility with resultant esophageal dilatation" per MD's note (Amy Ulloa MD, Cardiology note 4/23/2015)    Bilateral carpal tunnel syndrome     BPH (benign prostatic hypertrophy)     Cataract     Previously removed from right eye; 7/2015: Pt concerned about left eye cataract.    CHF (congestive heart failure)     Cardiomyopathy    Chronic gastritis 2/2005    Per EGD    Chronic kidney disease (CKD), stage III (moderate)     Coronary artery disease     CVA (cerebral vascular accident)     Diabetes mellitus type II     Dizziness     DJD (degenerative joint disease)     Dysarthria 2004 to present    Initial onset in ~ 2004 (pharyngeal/oropharyngeal dysphagia reported seen in regular barium swallow study); signficant worsening of swallowing after " "anoxic encephalopathy in 2010.    Esophageal abnormality 4/24/2015    Noted on chest xray and CT-chest: "Dilated esophagus" [to the level of the mid thoracic esophagus]; "may be achalasia or pseudo-achalasia related to tumor" per Kenn Bass MD, [facility]. Pt declined EGD at [facility] in 4/2015 and 5/2015.    Esophageal dysmotility 7/12/2004    Observed during regular barium swallow study.    Exotropia     Left eye, since infancy    G tube feedings     Gastropathy 2/2005    Nodular gastropathy, erythematous gastropathy per EGD    GERD (gastroesophageal reflux disease)     Hip fracture 2013    Laryngeal disorder     Decreased laryngeal sensation (see ENT note in [facility] documents: Dr. Warren, 05/26/2010)    LBBB (left bundle branch block)     Liver disorder     Hx of granulomatous liver with h/o mild LFT elevation    MI (myocardial infarction) 3/2014, 1/2015    NSTEMI    Oropharyngeal dysphagia 2004 to present    Onset 2003 or 2004 (s/p CABG) or 2005 (s/p CABG) per pt report; worsened in 2010 after anoxic brain injury from stroke and respiratory arrest; significant aspiration on swallow study 3/19/2014. Pharyngeal abnormalities noted during esophagram in 2004. Advised to be strict NPO at all times. He has had many visits of dysphagia therapy including visits many with neuromuscular electrical stimulation.   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 Ronald Washington    HIP SURGERY      Closed Reduction Percutaneous pinning, left impacted femoral neck fracture    PEG TUBE REMOVAL  11/1/2010    AR CABG, ARTERY-VEIN, THREE  2003    Coronary Artery Bypass, 3     Family History   Problem " Relation Age of Onset    Parkinsonism Neg Hx     Seizures Neg Hx     Stroke Neg Hx      Social History   Substance Use Topics    Smoking status: Never Smoker    Smokeless tobacco: Never Used    Alcohol use No     Review of Systems   Constitutional: Negative for activity change and appetite change.   HENT: Negative for congestion and ear pain.    Eyes: Negative for pain and redness.   Respiratory: Positive for cough. Negative for shortness of breath.    Cardiovascular: Negative for chest pain.   Gastrointestinal: Positive for abdominal pain. Negative for abdominal distention.   Genitourinary: Negative for difficulty urinating and dysuria.   Musculoskeletal: Negative for arthralgias and back pain.   Skin: Negative for color change and pallor.   Neurological: Negative for light-headedness and headaches.   All other systems reviewed and are negative.      Physical Exam   Initial Vitals   BP Pulse Resp Temp SpO2   04/04/17 1153 04/04/17 1153 04/04/17 1153 04/04/17 1153 04/04/17 1153   113/56 68 16 98.9 °F (37.2 °C) 95 %     Physical Exam    Nursing note and vitals reviewed.  Constitutional: He appears well-developed and well-nourished. He is not diaphoretic. No distress.   HENT:   Head: Normocephalic and atraumatic.   Right Ear: External ear normal.   Left Ear: External ear normal.   Mouth/Throat: No oropharyngeal exudate.   Eyes: EOM are normal. Pupils are equal, round, and reactive to light. No scleral icterus.   Neck: Normal range of motion. Neck supple. No JVD present.   Cardiovascular: Normal rate, regular rhythm, normal heart sounds and intact distal pulses. Exam reveals no friction rub.    No murmur heard.  Pulmonary/Chest: Breath sounds normal. No stridor. No respiratory distress. He has no wheezes. He has no rales.   Abdominal: Soft. He exhibits no distension. There is tenderness (LUQ). There is no rebound and no guarding.   PEG tube in place in the left upper quadrant, tenderness to palpation of the left  upper quadrant, no surrounding erythema, no current bleeding, no discharge noted.   Neurological: He is alert and oriented to person, place, and time. No cranial nerve deficit.   Skin: Skin is warm and dry. No erythema. No pallor.         ED Course   Procedures  Labs Reviewed   CBC W/ AUTO DIFFERENTIAL - Abnormal; Notable for the following:        Result Value    RBC 4.33 (*)     Hemoglobin 13.3 (*)     MCHC 31.5 (*)     RDW 15.3 (*)     MPV 14.1 (*)     Lymph # 0.9 (*)     Gran% 77.7 (*)     Lymph% 12.0 (*)     All other components within normal limits   ISTAT PROCEDURE - Abnormal; Notable for the following:     POC Glucose 165 (*)     POC BUN 47 (*)     POC Potassium 5.5 (*)     POC Hematocrit 34 (*)     All other components within normal limits   LIPASE   ISTAT CHEM8                   APC / Resident Notes:   HOII MDM   A/P: 76-year-old with left upper quadrant pain.  Differential diagnosis includes but is not limited to PEG tube misalignment, nonfunction, infection, abscess, cellulitis.  Workup: CBC, CMP, CT abdomen and pelvis.  Patient has gotten pain relief.  We are now currently pending results.  Tyler Betancur PGY-2 [employer] EM  04/04/2017 2:17 PM    Update:   Result significant for no leukocytosis, H/H13.3/42.2, no significant lateral abnormality, lipase negative.  CT scan shows no abscess, does show some inflammation/scarring.  As I was going into tell the patient about his results he was found to have his PEG tube dislodged, and sitting by the bedside.  We have placed a peg tube in the tract with gastric content return. Pt still wishes to be discharged from the ED with pain control, and does to wish to stay for any possible intervention or surgery from IR/GI. Will discharge with pain control and PCP follow up. Pt agrees to the plan.   Tyler Betancur PGY-2 [employer] [facility]  04/04/2017 4:53 PM           Attending Attestation:   Physician Attestation Statement for Resident:  As the supervising MD   Physician  Attestation Statement: I have personally seen and examined this patient.   I agree with the above history. -: Patient complains of pain around his PEG tube.  He states he has had this tube for approximately 1 year and has had a history of problems with his gastric tubes.  He has had pain here for a few days.  Denies problems eating other than that his tube feeds are going in more slowly than normal.  Denies fevers, denies abdominal distention.   As the supervising MD I agree with the above PE.   -: There is a small amount of skin overgrown over the stoma where the PEG tube inserts.  There is tenderness at the area of PEG tube insertion.  The remainder of the abdomen is soft and nontender.  No surrounding erythema.   As the supervising MD I agree with the above treatment, course, plan, and disposition.   -: Abdominal x-ray, independently interpreted by me, shows no sign of obstruction.  PEG tube is present in the left upper quadrant.  Unclear etiology of this patient's pain.  A CT scan with contrast was ordered to ascertain whether this pain is caused by local revision of the PEG tube versus a deep infection or hematoma or displacement of the tube.                    ED Course     Clinical Impression:   The primary encounter diagnosis was Pain around PEG tube site, initial encounter. A diagnosis of Abdominal pain was also pertinent to this visit.          Tyler Betancur MD  Resident  04/04/17 [id number]

## 2017-04-04 NOTE — ED NOTES
Presents to room 19 from waiting area ambulatory with assistance.  C/O left sided abd pain at PEG insertion site.  States that this area has been bleeding periodically.  Residual checked with 0 output.  PEG flushed with ease using 60ml of water while MD in room.  Small amount of what appears to be purulent draingage noted a PEG insertion site.   Bowel sounds present all quadrants.  Belongings placed in bag, patient gowned, bed in lowest position, side rails up and call light in reach.

## 2017-04-04 NOTE — ED NOTES
Nurse to patient room to assist him to bathroom.  PEG noted to be dislodged and lying on the stretcher with balloon deflated; no bleeding noted.  MD notified and he placed a 16fr lester catheter into the PEG insertion site until a regular PEG could be acquired.  Balloon to lester was inflated with 20ml NS.  Tolerated well.

## 2017-04-04 NOTE — ED AVS SNAPSHOT
[facility]  [address]  [facility] [address]               Anirudh A Esmail   2017 12:04 PM   ED    Description:  Male : 1940   Department:  [facility]           Your Care was Coordinated By:     Provider Role From To    Jo Vera MD Attending Provider 17 1257 --    Tyler Betancur MD Resident 17 1208 --    Trisha Burkett MD ED Temporary Attending 17 [phone] --      Reason for Visit     Abdominal Pain           Diagnoses this Visit        Comments    Pain around PEG tube site, initial encounter    -  Primary     Abdominal pain           ED Disposition     ED Disposition Condition Comment    Discharge             To Do List           Follow-up Information     Schedule an appointment as soon as possible for a visit with Benedict Ennis MD.    Specialty:  Internal Medicine    Contact information:    [address]  [place] [id number] [phone]        PURCHASE these Medications (No prescription required)        Start End    acetaminophen (TYLENOL) 500 mg Cap 2017     Sig: Please administer via GT every 6 hourly as needed    Class: OTC      [facility]     [facility] On Call Nurse Care Line - 24 Assistance  Unless otherwise directed by your provider, please contact [facility], our nurse care line that is available for  assistance.     Registered nurses in the [facility] provide: appointment scheduling, clinical advisement, health education, and other advisory services.  Call: [phone] (toll free)               Medications           Message regarding Medications     Verify the changes and/or additions to your medication regime listed below are the same as discussed with your clinician today.  If any of these changes or additions are incorrect, please notify your healthcare provider.        These medications were administered today        Dose Freq    HYDROmorphone  injection 0.5 mg 0.5 mg ED 1 Time    Sig: Inject 0.5 mLs (0.5 mg total) into the vein ED 1 Time.    Class: Normal    Route: Intravenous    omnipaque 350 iohexol 75 mL 75 mL IMG once as needed    Sig: Inject 75 mLs into the vein ONCE PRN for contrast.    Class: Normal    Route: Intravenous           Verify that the below list of medications is an accurate representation of the medications you are currently taking.  If none reported, the list may be blank. If incorrect, please contact your healthcare provider. Carry this list with you in case of emergency.           Current Medications     ascorbic acid, vitamin C, (VITAMIN C) 250 MG tablet 1 tablet (250 mg total) by Per G Tube route once daily.    atorvastatin (LIPITOR) 20 MG tablet 1 tablet (20 mg total) by Per G Tube route once daily.    cholestyramine (QUESTRAN) 4 gram packet     clopidogrel (PLAVIX) 75 mg tablet 1 tablet (75 mg total) by Per G Tube route once daily.    ferrous sulfate 220 mg (44 mg iron)/5 mL solution     finasteride (PROSCAR) 5 mg tablet 1 tablet (5 mg total) by Per G Tube route once daily.    furosemide (LASIX) 20 MG tablet 1 tablet (20 mg total) by Per G Tube route once daily.    lisinopril (PRINIVIL,ZESTRIL) 2.5 MG tablet 1 tablet (2.5 mg total) by Per G Tube route once daily.    magnesium oxide (MAG-OX) 400 mg tablet 1 tablet (400 mg total) by Per G Tube route 2 (two) times daily.    pantoprazole (PROTONIX) 40 MG tablet     pramipexole (MIRAPEX) 0.125 MG tablet 1 tablet (0.125 mg total) by Per G Tube route 2 (two) times daily.    tamsulosin (FLOMAX) 0.4 mg Cp24 1 capsule (0.4 mg total) by Per G Tube route once daily.    trazodone (DESYREL) 50 MG tablet 1 tablet (50 mg total) by Per G Tube route every evening.    acetaminophen (TYLENOL) 500 mg Cap Please administer via GT every 6 hourly as needed    nitroGLYCERIN (NITROSTAT) 0.4 MG SL tablet Place 1 tablet (0.4 mg total) under the tongue every 5 (five) minutes as needed for Chest pain.     "       Clinical Reference Information           Your Vitals Were     BP Pulse Temp Resp Height Weight    109/55 (BP Location: Left arm, BP Method: Automatic) 66 97.4 °F (36.3 °C) (Oral) 18 5' 7" (1.702 m) 69.4 kg (153 lb)    SpO2 BMI             97% 23.96 kg/m2         Allergies as of 4/4/2017        Reactions    Pork/porcine Containing Products     Patient wishes to avoid pork products in his diet.      Immunizations Administered on Date of Encounter - 4/4/2017     None      ED Micro, Lab, POCT     Start Ordered       Status Ordering Provider    04/04/17 1419 04/04/17 1419  ISTAT PROCEDURE  Once      Final result     04/04/17 1408 04/04/17 1407  ISTAT CHEM8  Once      Acknowledged     04/04/17 1408 04/04/17 1407  CBC auto differential  STAT      Final result     04/04/17 1408 04/04/17 1407  Lipase  STAT      Final result       ED Imaging Orders     Start Ordered       Status Ordering Provider    04/04/17 1412 04/04/17 1414  CT Abdomen Pelvis With Contrast  1 time imaging      Final result     04/04/17 1300 04/04/17 1300  X-Ray Abdomen AP 1 View (KUB)  1 time imaging      Final result         Discharge Instructions         Discharge Instructions: Caring for Your Gastrostomy Tube (G-Tube)  You have been discharged with a gastrostomy tube, or G-tube. The G-tube was inserted through your belly (abdominal) wall and into your stomach. The tube will provide you with food, fluids, and medicine. Your G-tube may move in and out slightly. If the tube comes out all the way in the first few weeks after placement, dont put it back in. Call your healthcare provider right away. Dont wait until the next day. This is important because the G-tube tract through the skin may close very quickly, often within 24 hours. After the first few weeks, if the tube comes out, ask your provider how to get a spare tube. Ask your provider how to replace it at home.   General guidelines for use  · Wash your hands thoroughly with soap and warm " water before starting your feeding.  · During the feeding and for 1 hour after, sit in a chair or sit up in bed.  · Before feeding begins, check to see that your stomach is empty. You will need a syringe for the following steps:   ¨ Insert the tip of an empty syringe into the end of the G-tube.  ¨ Pull back on the syringe to withdraw contents of the stomach.  ¨ Dont begin the feeding if more than 100 mL remains from the previous feeding.  · Clean the area around the tube with mild soap and water.  · Pat the area dry during bathing and as needed.  · Clean the area more often if it gets wet or is leaking some discharge (weeping).  · Keep the disk (flange) a few millimeters off the skin. This should leave just enough room for a gauze sponge. Pulling the flange too tightly can damage the skin. But leaving the flange too loose leads to leaking around the G-tube. Your healthcare team will go over these guidelines before you leave the hospital.     The name of my feeding supplement/formula is:  ___________________________________________     Amount per feeding:   ___________________________________________     Times per day:  ___________________________________________     Amount of water used to flush tube:  ___________________________________________   Gravity feeding method  · Fill the feeding bag with the prescribed amount of formula. Run the fluid to the end of the tube to clear out any air. Clamp the tube.  · Connect the end of the feeding bag tubing to the G-tube.  · Hang the bag at least 18 inches above the level of your G-tube.  · Open the clamp and allow the formula to flow into the G-tube.  · Follow with the prescribed amount of water.  · After each feeding, rinse the bag and tubing. Every 24 hours, wash the bag and tubing with soapy water and rinse thoroughly.  Pump feeding method  · Fill the feeding bag with the prescribed amount of formula. Run the fluid to the end of the tube to clear out any air. Clamp the  tube.  · Connect the end of the feeding bag tubing to the G-tube. Set the pump rate of flow to the prescribed rate per hour.  · Open the clamp on the tubing; press the start button on your pump.  · When feeding is complete, disconnect the feeding set.  · Connect the tip of an empty syringe to the feeding tube and slowly push in the prescribed amount of water.  · After each feeding, rinse the bag and tubing. Every 24 hours, wash the bag and tubing with soapy water and rinse thoroughly.  Syringe feeding method  · Remove the plunger from a syringe and connect the syringe to the G-tube.  · Hold the syringe upright and pour the formula into the syringe.  · Refill the syringe as the formula reaches the bottom of the syringe.  · Repeat the process until the prescribed amount of formula is given.  · Follow the feeding with the prescribed amount of water.  · After each feeding, rinse the bag and tubing. Every 24 hours, wash the bag and tubing with soapy water and rinse thoroughly.  When to call your provider  Call your healthcare provider right away if you have any of the following:  · The tube comes out   · The tube is blocked  · Vomiting  · Fever above 100.4°F (38.0°C)  · Diarrhea that lasts more than 2 days  · Signs of infection (redness, swelling, or warmth at the tube site)  · Drainage from the tube site   Date Last Reviewed: 8/1/2016  © 2174-9257 [employer]. [address] [place] [address]. All rights reserved. This information is not intended as a substitute for professional medical care. Always follow your healthcare professional's instructions.      Feeding Tube Replacement  Your feeding tube has been replaced. Unless you are told otherwise, you may resume your usual feeding schedule. Feeding tubes are usually replaced every 6 to 12 months.  Home care  The following will help you care for yourself at home:  · Continue feedings as usual.  ¨ Wash your hands with soap and water before preparing  the formula or touching the feeding tube.  ¨ Because the tube is narrow, use only commercial feeding formulas so the tube won't get clogged. These formulas are designed to provide all the protein, carbohydrates, vitamins, and minerals needed. Follow your healthcare providers advice (or the registered dietitian or nurse who is working with you) regarding the number of feedings to give each day.  ¨ Flush the tube with clear water before and after feedings or after medicine has been given through the tube. This is very important. Otherwise, the tube can get blocked.  ¨ When feeding, the patient should be upright or reclining at not less than 30 degrees to reduce the risk of the feeding solution draining improperly and causing aspiration into the lungs. Keep the patient in this position for at least 30 minutes after the feeding.  ¨ Infuse food slowly. It may take more than 1 hour for 1 feeding session.  · If the tube becomes blocked, flush with a syringe full of water.  · If you feel bloated after feeding, remove the cap from the end of the tube so that extra air in the stomach can flow out. Cough to help remove the extra air.  · Oral and dental care is needed, even if you are not taking any food or liquids by mouth. Brush your teeth and gums daily. Keep the lips moist with a lip balm or petroleum jelly.  Follow-up care  Follow up with your healthcare provider, or as advised.  Call 911  Call 911 if any of the following occur:  · Chest pain  · Trouble breathing  When to seek medical care  Call your healthcare provider right away if any of these occur:  · Feeding tube becomes blocked and you are not able to clear it  · Feeding tube falls out  · Fever of 100.4ºF (38ºC) or higher, or as directed by your healthcare provider  · Leakage of stomach contents around the tube onto the abdomen  · Pain or swelling in your abdomen that gets worse  · Redness, pus, or bleeding at the insertion site  Date Last Reviewed: 7/1/2016  ©  4239-8980 [employer]. [address] [place] [address], [place] [address]. All rights reserved. This information is not intended as a substitute for professional medical care. Always follow your healthcare professional's instructions.             [facility] complies with applicable Federal civil rights laws and does not discriminate on the basis of race, color, national origin, age, disability, or sex.        Language Assistance Services     ATTENTION: Language assistance services are available, free of charge. Please call [phone].      ATENCIÓN: Si habla español, tiene a mulligan disposición servicios gratuitos de asistencia lingüística. Llame al [phone].     CHÚ Ý: N?u b?n nói Ti?ng Vi?t, có các d?ch v? h? tr? ngôn ng? mi?n phí dành cho b?n. G?i s? [phone].

## 2017-04-04 NOTE — ED NOTES
MD at bedside and lester removed from PEG site.  New 16ga PEG was inserted by MD and balloon inflated with 20ml NS.  Tolerated well and gauze dressing applied to PEG insertion site.

## 2017-04-12 NOTE — PROCEDURES - EMERGENCY DEPT.
PEG tube dislodged during patient transfer.  Tract very mature so lester catheter easily placed in PEG tube tract.  Pt tolerated well, to be discharged home.

## 2017-04-26 ENCOUNTER — HOSPITAL ENCOUNTER (EMERGENCY)
Facility: HOSPITAL | Age: 77
Discharge: HOME | End: 2017-04-26
Attending: EMERGENCY MEDICINE | Admitting: EMERGENCY MEDICINE
Payer: MEDICARE

## 2017-04-26 VITALS
RESPIRATION RATE: 18 BRPM | DIASTOLIC BLOOD PRESSURE: 81 MMHG | BODY MASS INDEX: 24.33 KG/M2 | TEMPERATURE: 98 F | SYSTOLIC BLOOD PRESSURE: 153 MMHG | HEART RATE: 66 BPM | OXYGEN SATURATION: 97 % | WEIGHT: 155 LBS | HEIGHT: 67 IN

## 2017-04-26 DIAGNOSIS — K94.23 GASTROSTOMY MALFUNCTION: Primary | ICD-10-CM

## 2017-04-26 PROCEDURE — 43760 *HC REPLACEMENT GASTROS TUBE: CPT

## 2017-04-26 PROCEDURE — 99283 EMERGENCY DEPT VISIT LOW MDM: CPT | Mod: 25,,, | Performed by: EMERGENCY MEDICINE

## 2017-04-26 PROCEDURE — 43760 PR CHANGE GASTROSTOMY TUBE PERCUTANEOUS W/O GUIDE: CPT | Mod: ,,, | Performed by: EMERGENCY MEDICINE

## 2017-04-26 PROCEDURE — 99283 EMERGENCY DEPT VISIT LOW MDM: CPT | Mod: 25

## 2017-04-26 NOTE — ED TRIAGE NOTES
Patient presents to ER with complaints of PEG tube falling out again. Patient states he woke up around 0800 and noticed that his tube had fallen out and that he had it replaced about two weeks ago.

## 2017-04-26 NOTE — ED PROVIDER NOTES
"Encounter Date: 4/26/2017    SCRIBE #1 NOTE: I, Ean Jha, am scribing for, and in the presence of,  Earle Gurrola MD. I have scribed the entire note.       History     Chief Complaint   Patient presents with    GI Problem     gi tube came out last night     Review of patient's allergies indicates:   Allergen Reactions    Pork/porcine containing products      Patient wishes to avoid pork products in his diet.     HPI Comments: Time seen by provider: 12:17 PM    This is a 76 y.o. Male with PMH of DM, CAD, BPH, GERD, CVA and Oropharyngeal dysphagia who presents for evaluation of GI tube complication. Presented here about 3 weeks ago for similar issue. Reports last night his tube fell out, but has had this one in place for the last 3 weeks prior to last night. Has had G-tube for 3 or 4 years now. Denies any pain to site of tube insertion or signs of infection including fever, chills, N/V/D or erythema around tube site. No chest pain or SOB. No further complaints or concerns at this time.       The history is provided by the patient and medical records.     Past Medical History:   Diagnosis Date    Amblyopia of left eye     Since infancy    Atypical chest pain 4/23/2015    "d/t to a possible esophageal obstruction, stricture or esophageal dysmotility with resultant esophageal dilatation" per MD's note (Amy Ulloa MD, Cardiology note 4/23/2015)    Bilateral carpal tunnel syndrome     BPH (benign prostatic hypertrophy)     Cataract     Previously removed from right eye; 7/2015: Pt concerned about left eye cataract.    CHF (congestive heart failure)     Cardiomyopathy    Chronic gastritis 2/2005    Per EGD    Chronic kidney disease (CKD), stage III (moderate)     Coronary artery disease     CVA (cerebral vascular accident)     Diabetes mellitus type II     Dizziness     DJD (degenerative joint disease)     Dysarthria 2004 to present    Initial onset in ~ 2004 (pharyngeal/oropharyngeal dysphagia " "reported seen in regular barium swallow study); signficant worsening of swallowing after anoxic encephalopathy in 2010.    Esophageal abnormality 4/24/2015    Noted on chest xray and CT-chest: "Dilated esophagus" [to the level of the mid thoracic esophagus]; "may be achalasia or pseudo-achalasia related to tumor" per Kenn Bass MD, [facility]. Pt declined EGD at [facility] in 4/2015 and 5/2015.    Esophageal dysmotility 7/12/2004    Observed during regular barium swallow study.    Exotropia     Left eye, since infancy    G tube feedings     Gastropathy 2/2005    Nodular gastropathy, erythematous gastropathy per EGD    GERD (gastroesophageal reflux disease)     Hip fracture 2013    Laryngeal disorder     Decreased laryngeal sensation (see ENT note in [facility] documents: Dr. Warren, 05/26/2010)    LBBB (left bundle branch block)     Liver disorder     Hx of granulomatous liver with h/o mild LFT elevation    MI (myocardial infarction) 3/2014, 1/2015    NSTEMI    Oropharyngeal dysphagia 2004 to present    Onset 2003 or 2004 (s/p CABG) or 2005 (s/p CABG) per pt report; worsened in 2010 after anoxic brain injury from stroke and respiratory arrest; significant aspiration on swallow study 3/19/2014. Pharyngeal abnormalities noted during esophagram in 2004. Advised to be strict NPO at all times. He has had many visits of dysphagia therapy including visits many with neuromuscular electrical stimulation.   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 Ronald Washington    HIP SURGERY      Closed Reduction Percutaneous pinning, left impacted femoral neck fracture    PEG TUBE REMOVAL  11/1/2010    [place] CABG, " ARTERY-VEIN, THREE  2003    Coronary Artery Bypass, 3     Family History   Problem Relation Age of Onset    Parkinsonism Neg Hx     Seizures Neg Hx     Stroke Neg Hx      Social History   Substance Use Topics    Smoking status: Never Smoker    Smokeless tobacco: Never Used    Alcohol use No     Review of Systems   Constitutional: Negative for chills and fever.   Respiratory: Negative for shortness of breath.    Cardiovascular: Negative for chest pain.   Gastrointestinal: Negative for abdominal pain, diarrhea, nausea and vomiting.        (+) GI Tube complication w/o sign of infection including surrounding erythema       Physical Exam   Initial Vitals   BP Pulse Resp Temp SpO2   04/26/17 1027 04/26/17 1027 04/26/17 1027 04/26/17 1027 04/26/17 1027   153/81 66 18 97.6 °F (36.4 °C) 97 %     Physical Exam    Nursing note and vitals reviewed.  Constitutional: He appears well-developed and well-nourished. He is not diaphoretic. No distress.   HENT:   Head: Normocephalic and atraumatic.   Eyes: EOM are normal. Pupils are equal, round, and reactive to light.   Neck: Normal range of motion. Neck supple.   Cardiovascular: Normal rate and regular rhythm. Exam reveals no gallop and no friction rub.    No murmur heard.  Pulmonary/Chest: Breath sounds normal. No respiratory distress. He has no wheezes. He has no rhonchi. He has no rales.   Abdominal: Soft. He exhibits no distension. There is no tenderness. There is no rebound and no guarding.   G tube site noted   Musculoskeletal: Normal range of motion. He exhibits no edema or tenderness.   Neurological: He is alert and oriented to person, place, and time. He has normal strength. No cranial nerve deficit or sensory deficit.   Skin: Skin is warm and dry. No rash noted. No erythema.   Psychiatric: He has a normal mood and affect. His behavior is normal. Judgment and thought content normal.         ED Course   Feeding Tube  Date/Time: 4/26/2017 12:36 PM  Performed by: LANA  EARLE CROCKETT  Authorized by: EARLE GURROLA III   Consent: Verbal consent obtained. Written consent not obtained.  Risks and benefits: risks, benefits and alternatives were discussed  Consent given by: patient  Indications: tube dislodged  Preparation: Patient was prepped and draped in the usual sterile fashion.  Sedation:  Patient sedated: no    Local anesthesia used: no    Anesthesia:  Local anesthesia used: no  Tube type: gastrostomy  Patient position: supine  Procedure type: replacement  Tube size: 16 Fr (initially 16F would not go in, had to use 12F and dilated up to 16F G-tube)  Endoscope used: no  Bulb inflation volume: 20 (ml)  Bulb inflation fluid: sterile water  Placement/position confirmation: gastric contents aspirated  Tube placement difficulty: moderate  Complications: No  Specimens: No  Implants: No  Patient tolerance: Patient tolerated the procedure well with no immediate complications        Labs Reviewed - No data to display          Medical Decision Making:   History:   Old Medical Records: I decided to obtain old medical records.  ED Management:  Pt with G-tube complication. Had to dilate from a 12 Japanese to a 16 Japanese G-tube. No complications. Will discharge home.             Scribe Attestation:   Scribe #1: I performed the above scribed service and the documentation accurately describes the services I performed. I attest to the accuracy of the note.    Attending Attestation:           Physician Attestation for Scribe:  Physician Attestation Statement for Scribe #1: I, Earle Gurrola MD, reviewed documentation, as scribed by Ean Jha in my presence, and it is both accurate and complete.                 ED Course     Clinical Impression:   The encounter diagnosis was Gastrostomy malfunction.    Disposition:   Disposition: Discharged  Condition: Stable       Earle Gurrola III, MD  04/26/17 1699

## 2017-04-26 NOTE — ED AVS SNAPSHOT
[facility]  [address]  [place] [address]               Anirudh SUZI Esmail   2017 12:08 PM   ED    Description:  Male : 1940   Department:  [facility]           Your Care was Coordinated By:     Provider Role From To    Earle Gurrola III, MD Attending Provider 17 1216 --      Reason for Visit     GI Problem           Diagnoses this Visit        Comments    Gastrostomy malfunction    -  Primary       ED Disposition     None           To Do List           Follow-up Information     Follow up with Benedict Ennis MD. Schedule an appointment as soon as possible for a visit in 3 days.    Specialty:  Internal Medicine    Contact information:    [address]  [place] [id number] [phone]        [facility]     [facility] Care Line -  Assistance  Unless otherwise directed by your provider, please contact [facility], our nurse care line that is available for  assistance.     Registered nurses in the [facility] provide: appointment scheduling, clinical advisement, health education, and other advisory services.  Call: [phone] (toll free)               Medications           Message regarding Medications     Verify the changes and/or additions to your medication regime listed below are the same as discussed with your clinician today.  If any of these changes or additions are incorrect, please notify your healthcare provider.             Verify that the below list of medications is an accurate representation of the medications you are currently taking.  If none reported, the list may be blank. If incorrect, please contact your healthcare provider. Carry this list with you in case of emergency.           Current Medications     acetaminophen (TYLENOL) 500 mg Cap Please administer via GT every 6 hourly as needed    ascorbic acid, vitamin C, (VITAMIN C) 250 MG tablet 1 tablet (250 mg  "total) by Per G Tube route once daily.    atorvastatin (LIPITOR) 20 MG tablet 1 tablet (20 mg total) by Per G Tube route once daily.    cholestyramine (QUESTRAN) 4 gram packet     clopidogrel (PLAVIX) 75 mg tablet 1 tablet (75 mg total) by Per G Tube route once daily.    ferrous sulfate 220 mg (44 mg iron)/5 mL solution     finasteride (PROSCAR) 5 mg tablet 1 tablet (5 mg total) by Per G Tube route once daily.    furosemide (LASIX) 20 MG tablet 1 tablet (20 mg total) by Per G Tube route once daily.    lisinopril (PRINIVIL,ZESTRIL) 2.5 MG tablet 1 tablet (2.5 mg total) by Per G Tube route once daily.    magnesium oxide (MAG-OX) 400 mg tablet 1 tablet (400 mg total) by Per G Tube route 2 (two) times daily.    nitroGLYCERIN (NITROSTAT) 0.4 MG SL tablet Place 1 tablet (0.4 mg total) under the tongue every 5 (five) minutes as needed for Chest pain.    pantoprazole (PROTONIX) 40 MG tablet     pramipexole (MIRAPEX) 0.125 MG tablet 1 tablet (0.125 mg total) by Per G Tube route 2 (two) times daily.    tamsulosin (FLOMAX) 0.4 mg Cp24 1 capsule (0.4 mg total) by Per G Tube route once daily.    trazodone (DESYREL) 50 MG tablet 1 tablet (50 mg total) by Per G Tube route every evening.           Clinical Reference Information           Your Vitals Were     BP Pulse Temp Resp Height Weight    153/81 66 97.6 °F (36.4 °C) (Oral) 18 5' 7" (1.702 m) 70.3 kg (155 lb)    SpO2 BMI             97% 24.28 kg/m2         Allergies as of 4/26/2017        Reactions    Pork/porcine Containing Products     Patient wishes to avoid pork products in his diet.      Immunizations Administered on Date of Encounter - 4/26/2017     None      ED Micro, Lab, POCT     None      ED Imaging Orders     None      Discharge References/Attachments     FEEDING TUBE REPLACEMENT (ENGLISH)       [facility] complies with applicable Federal civil rights laws and does not discriminate on the basis of race, color, national origin, age, disability, or " sex.        Language Assistance Services     ATTENTION: Language assistance services are available, free of charge. Please call [phone].      ATENCIÓN: Si habla español, tiene a mulligan disposición servicios gratuitos de asistencia lingüística. Llame al [phone].     CHÚ Ý: N?u b?n nói Ti?ng Vi?t, có các d?ch v? h? tr? ngôn ng? mi?n phí dành cho b?n. G?i s? [phone].

## 2020-06-17 NOTE — ED TRIAGE NOTES
Pt arrived to the ED with CC of hematuria. Pt states he started having painful urination with blood last night. Pt states he got up at least 4 times last night.    Itraconazole Pregnancy And Lactation Text: This medication is Pregnancy Category C and it isn't know if it is safe during pregnancy. It is also excreted in breast milk.

## 2021-07-01 ENCOUNTER — PATIENT MESSAGE (OUTPATIENT)
Dept: ADMINISTRATIVE | Facility: OTHER | Age: 81
End: 2021-07-01

## 2022-12-16 ENCOUNTER — TELEPHONE (OUTPATIENT)
Dept: ENDOSCOPY | Facility: HOSPITAL | Age: 82
End: 2022-12-16
Payer: MEDICARE

## 2022-12-16 NOTE — TELEPHONE ENCOUNTER
----- Message from Jaciel Perry MD sent at 12/16/2022  2:52 PM CST -----  Regarding: RE: Letter for  Needed  Contact: [place] [phone]  I agree.  We cannot help him.  We haven't seen him in over 7 years.  I would not consider him a patient we are treating.  He needs to contact his PCP or whoever sees him regularly.       ----- Message -----  From: Hilda Riddle MA  Sent: 12/16/2022  12:57 PM CST  To: Jaciel Perry MD  Subject: FW: Letter for  Needed                      I assume this is something you cannot say as you are not his PCP.  ----- Message -----  From: Marina Knowles  Sent: 12/16/2022   9:22 AM CST  To: Orlando Grissom Staff  Subject: Letter for  Needed                          Caller ([place]) daughter requesting a call back and a letter of her dads care so she can present to a .  Pt states other sister took all of her dads property in another country and a  is asking Merced to get a letter from the doctor reporting everything that was wrong with Anirudh.

## 2022-12-20 ENCOUNTER — TELEPHONE (OUTPATIENT)
Dept: GASTROENTEROLOGY | Facility: CLINIC | Age: 82
End: 2022-12-20
Payer: MEDICARE

## 2022-12-20 NOTE — TELEPHONE ENCOUNTER
----- Message from Kenn Bass MD sent at 2022  7:50 AM CST -----  Regarding: RE: Pt Advice  Contact: Merced Guillermo daughter [phone]  I dictated a letter.  I am not sure this is everything they want.  I put a feeding tube in  and later removed it because he was doing better and regained his ability to swallow.  And I have not seen him since   I see where Dr. Washington surgery put another feeding tube in in , but I was not involved in that decision making or medical care  ----- Message -----  From: Alejandra Jade MA  Sent: 2022   4:30 PM CST  To: Kenn Bass MD  Subject: FW: Pt Advice                                      ----- Message -----  From: Gomez Cota  Sent: 2022   4:26 PM CST  To: Kali [facility] Staff  Subject: Pt Advice                                        Pt's daughter is calling to ask that a letter be given to her stating that at the time her father () was a patient he had a stroke causing him to be feed through feeding tube. She has all the medical records, but the  is requesting a letter with a brief summary of his state. Please call.

## 2022-12-20 NOTE — TELEPHONE ENCOUNTER
Spoke with Merced.  Letter mailed to her at :[address] [place] [address], [phone].  Mirlande